# Patient Record
Sex: FEMALE | Race: WHITE
[De-identification: names, ages, dates, MRNs, and addresses within clinical notes are randomized per-mention and may not be internally consistent; named-entity substitution may affect disease eponyms.]

---

## 2020-02-10 ENCOUNTER — HOSPITAL ENCOUNTER (EMERGENCY)
Dept: HOSPITAL 25 - UCCORT | Age: 55
Discharge: HOME HEALTH | End: 2020-02-10
Payer: COMMERCIAL

## 2020-02-10 VITALS — SYSTOLIC BLOOD PRESSURE: 110 MMHG | DIASTOLIC BLOOD PRESSURE: 76 MMHG

## 2020-02-10 DIAGNOSIS — J18.9: Primary | ICD-10-CM

## 2020-02-10 DIAGNOSIS — Z91.09: ICD-10-CM

## 2020-02-10 PROCEDURE — G0463 HOSPITAL OUTPT CLINIC VISIT: HCPCS

## 2020-02-10 PROCEDURE — 71046 X-RAY EXAM CHEST 2 VIEWS: CPT

## 2020-02-10 PROCEDURE — 99213 OFFICE O/P EST LOW 20 MIN: CPT

## 2020-02-10 NOTE — UC
Respiratory Complaint HPI





- HPI Summary


HPI Summary: 





54 year old female with PMH + for thyroid disease presents with cough, sore 

throat, fatigue, fever, chills starting this AM.   chest tightness/ cough sevre

, treated wtih Nyquil well, however increased fatigue.    Recently returned 

from [place].    





- History of Current Complaint


Stated Complaint: FLU LIKE SYMPTOMS


Time Seen by Provider: 02/10/20 17:57


Hx Obtained From: Patient, Family/Caretaker - 


Pregnant?: No


Onset/Duration: Sudden Onset


Timing: Constant


Severity Initially: Moderate


Severity Currently: Moderate


Pain Scale Used: 0-10 Numeric


Character: Cough: Nonproductive


Aggravating Factors: Deep Breaths


Associated Signs And Symptoms: Positive: Dyspnea, Fever, Chills, URI, Nasal 

Congestion, Sinus Discomfort.  Negative: Dizziness, Calf Pain, Calf Swelling





- Allergies/Home Medications


Allergies/Adverse Reactions: 


 Allergies











Allergy/AdvReac Type Severity Reaction Status Date / Time


 


iodine Allergy  Unknown Verified 02/10/20 18:36





   Reaction  





   Details  











Home Medications: 


 Home Medications





Losartan/Hydrochlorothiazide [Losartan-Hctz 50-12.5 mg Tab] 1 tab DAILY 02/10/

20 [History Confirmed 02/10/20]


Thyroid,Pork [[place] Thyroid] 1 tab DAILY 02/10/20 [History Confirmed 02/10/20]











Review of Systems


All Other Systems Reviewed And Are Negative: Yes


Constitutional: Positive: Fever - starting this afternoon, > 101 despite 

medication, Chills, Fatigue


ENT: Positive: Sore Throat, Nasal Discharge, Sinus Congestion, Sinus Pain/

Tenderness


Respiratory: Positive: Shortness Of Breath, Cough


Gastrointestinal: Positive: Negative


Genitourinary: Positive: Negative


Is Patient Immunocompromised?: No





Physical Exam


Triage Information Reviewed: Yes


Appearance: No Pain Distress, Well-Nourished, Ill-Appearing - moderate


Vital Signs Reviewed: Yes


Eyes: Positive: Conjunctiva Clear, Discharge - watery b/l


ENT: Positive: Pharynx normal, Nasal congestion, TM red - minimal b/l, Sinus 

tenderness - b/l, Uvula midline.  Negative: Tonsillar swelling, Tonsillar 

exudate


Neck: Positive: Supple, Tenderness @ - submand b/l


Respiratory: Positive: Chest non-tender, No respiratory distress, No accessory 

muscle use, Crackles, Wheezing


Cardiovascular: Positive: RRR, No Murmur, Tachycardia - mild


Neurological Exam: Normal


Neurological: Positive: Alert


Psychological Exam: Normal





Respiratory Course/Dx





- Course


Course Of Treatment: 





Called over to [facility] discussed case with C[place]AND Bingham.  





DUe to severity of symptoms with sudden onset, vital signs, and CXR findings, 

patient sent to ER for further work up and care.  











- Differential Dx/Diagnosis


Provider Diagnosis: 


 PNA (pneumonia)








Discharge ED





- Sign-Out/Discharge


Documenting (check all that apply): Patient Departure


All imaging exams completed and their final reports reviewed: Yes





- Discharge Plan


Condition: Guarded


Disposition: HOME-RECOMMEND TO ED


Referrals: 


Nora Humphries PA [Primary Care Provider] - 


Additional Instructions: 


- Due to sudden symptoms and chest X_ray findings, recommended that you go to 

ER for further evaluation.  











- Billing Disposition and Condition


Condition: GUARDED


Disposition: Home-Recommend to ED





- Attestation Statements


Provider Attestation: 





I was available for consult. This patient was seen by the [facility]. The patient was 

not presented to, seen by, or examined by me. -Kaushal